# Patient Record
Sex: MALE | Race: BLACK OR AFRICAN AMERICAN | Employment: UNEMPLOYED | URBAN - METROPOLITAN AREA
[De-identification: names, ages, dates, MRNs, and addresses within clinical notes are randomized per-mention and may not be internally consistent; named-entity substitution may affect disease eponyms.]

---

## 2022-09-17 ENCOUNTER — HOSPITAL ENCOUNTER (EMERGENCY)
Age: 52
Discharge: HOME | End: 2022-09-17
Attending: STUDENT IN AN ORGANIZED HEALTH CARE EDUCATION/TRAINING PROGRAM
Payer: MEDICAID

## 2022-09-17 VITALS
TEMPERATURE: 100.1 F | RESPIRATION RATE: 16 BRPM | SYSTOLIC BLOOD PRESSURE: 141 MMHG | HEART RATE: 108 BPM | OXYGEN SATURATION: 99 % | DIASTOLIC BLOOD PRESSURE: 84 MMHG

## 2022-09-17 DIAGNOSIS — J06.9 VIRAL URI: Primary | ICD-10-CM

## 2022-09-17 DIAGNOSIS — U07.1 COVID-19: ICD-10-CM

## 2022-09-17 LAB — SARS-COV-2, NAAT: DETECTED

## 2022-09-17 PROCEDURE — 6370000000 HC RX 637 (ALT 250 FOR IP): Performed by: STUDENT IN AN ORGANIZED HEALTH CARE EDUCATION/TRAINING PROGRAM

## 2022-09-17 PROCEDURE — 99283 EMERGENCY DEPT VISIT LOW MDM: CPT

## 2022-09-17 PROCEDURE — 87635 SARS-COV-2 COVID-19 AMP PRB: CPT

## 2022-09-17 RX ORDER — ACETAMINOPHEN 500 MG
1000 TABLET ORAL ONCE
Status: COMPLETED | OUTPATIENT
Start: 2022-09-17 | End: 2022-09-17

## 2022-09-17 RX ADMIN — ACETAMINOPHEN 1000 MG: 500 TABLET ORAL at 20:22

## 2022-09-17 ASSESSMENT — ENCOUNTER SYMPTOMS
NAUSEA: 0
SHORTNESS OF BREATH: 0
DIARRHEA: 0
SORE THROAT: 1
COUGH: 1
VOMITING: 0
ABDOMINAL PAIN: 0
BACK PAIN: 0
EYE PAIN: 0

## 2022-09-17 ASSESSMENT — PAIN SCALES - GENERAL
PAINLEVEL_OUTOF10: 0
PAINLEVEL_OUTOF10: 0

## 2022-09-17 ASSESSMENT — PAIN - FUNCTIONAL ASSESSMENT: PAIN_FUNCTIONAL_ASSESSMENT: NONE - DENIES PAIN

## 2022-09-18 NOTE — ED PROVIDER NOTES
[address] [address] ENCOUNTER      Pt Name: Tye Franco  MRN: [id number]  [place] 1970  Date of evaluation: 9/17/2022  Provider: Selma Can MD    CHIEF COMPLAINT       Chief Complaint   Patient presents with    Positive For Covid-19     Pt states he was at a bible camp when he started not feeling well with head cold symptoms. Was tested there and it came back Positive. Pt states he does not believe the +Result and requesting we test him here. Took Ibuprofen earlier today for HA. Slight dry cough. Denies pain. Nasal congestion    HISTORY OF PRESENT ILLNESS   (Location/Symptom, Timing/Onset,Context/Setting, Quality, Duration, Modifying Factors, Severity)  Note limiting factors. Tye Franco is a 47 y.o. male who presents to the ED with a chief complaint of nasal congestion for the past day. Onset gradual, states feeling better than he did this morning. Was diagnosed with COVID via a rapid strep but patient was skeptical about the test results. He denies shortness of breath, nausea, vomiting. Has dry nonproductive cough, mild headache that resolved after he took 2 ibuprofen at home. Sore throat. States feels fine, \"I don't think that I have COVID. \"  Patient is vaccinated, boosted. Symptoms not otherwise alleviated or exacerbated by other factors. NursingNotes were reviewed. REVIEW OF SYSTEMS    (2-9 systems for level 4, 10 or more for level 5)     Review of Systems   Constitutional:  Negative for chills and fever. HENT:  Positive for congestion and sore throat. Eyes:  Negative for pain and visual disturbance. Respiratory:  Positive for cough. Negative for shortness of breath. Cardiovascular:  Negative for chest pain and palpitations. Gastrointestinal:  Negative for abdominal pain, diarrhea, nausea and vomiting. Genitourinary:  Negative for dysuria and frequency. Musculoskeletal:  Negative for back pain and neck pain.    Skin: Negative for rash and wound. Neurological:  Positive for headaches. Negative for dizziness, weakness and light-headedness. PAST MEDICAL HISTORY     Past Medical History:   Diagnosis Date    Bipolar 1 disorder ([facility] [address]     Depression     Schizo affective schizophrenia ([facility] [address]          SURGICALHISTORY     History reviewed. No pertinent surgical history. CURRENT MEDICATIONS     Seroquel. Depakote, benztropine. ALLERGIES     Patient has no known allergies. FAMILY HISTORY     History reviewed. No pertinent family history. SOCIAL HISTORY       Social History     Socioeconomic History    Marital status: Single     Spouse name: None    Number of children: None    Years of education: None    Highest education level: None   Tobacco Use    Smoking status: Every Day     Types: Cigarettes    Smokeless tobacco: Never   Vaping Use    Vaping Use: Never used       SCREENINGS    [employer] Coma Scale  Eye Opening: Spontaneous  Best Verbal Response: Oriented  Best Motor Response: Obeys commands  [employer] Coma Scale Score: 15        PHYSICAL EXAM    (up to 7 for level 4, 8 or more for level 5)     ED Triage Vitals [09/17/22 1940]   BP Temp Temp Source Heart Rate Resp SpO2 Height Weight   (!) 141/84 100.1 °F (37.8 °C) Oral (!) 108 16 99 % -- --       General: Alert and oriented appropriately for age, No acute distress. Eye: Normal conjunctiva. Sclera anicteric. HENT: Oral mucosa is moist.  Oropharynx unremarkable. Respiratory: Respirations even and non-labored. Clear to auscultation bilaterally. Cardiovascular: Normal rate, Regular rhythm. Intact peripheral pulses. Not tachycardic on my exam.  Gastrointestinal: Soft, Non-tender, Non-distended. : deferred. Musculoskeletal: No swelling. Integumentary: Warm, Dry. No rashes. Neurologic: Alert and appropriate for age. No focal deficits. Psychiatric: Cooperative.     DIAGNOSTIC RESULTS     LABS:  Labs Reviewed   COVID-19, RAPID - Abnormal; Notable for the following components:       Result Value    SARS-CoV-2, NAAT DETECTED (*)     All other components within normal limits    Narrative:     Yasmany Solorio tel. [id number],  Microbiology results called to and read back by Riley Leos RN, 2022  20:24, by [facility]       All other labs were within normal range or not returned as of this dictation. EMERGENCY DEPARTMENT COURSE and DIFFERENTIAL DIAGNOSIS/MDM:   Vitals:    Vitals:    22 1940   BP: (!) 141/84   Pulse: (!) 108   Resp: 16   Temp: 100.1 °F (37.8 °C)   TempSrc: Oral   SpO2: 99%         Medical decision makin-year-old man with mild URI symptoms, likely viral in etiology, likely COVID given the recent positive test.  Patient is interested in repeat testing. Discussed the possibility of a false negative test, especially early on in his symptomatic course. Patient perseverative about this, given Tylenol for mild hyperthermia, tested for COVID with rapid test.  Rapid test is positive. At this point patient is amenable to receiving the diagnosis of COVID-19 as etiology of his symptoms. He is hemodynamically stable, afebrile, protecting his airway, feeling fine, stable for and amenable to discharge home. Given discharge instructions, return precautions, isolation precautions, voiced understanding. Discharged home, ambulated steadily from the emergency department upon discharge. Medications   acetaminophen (TYLENOL) tablet 1,000 mg (1,000 mg Oral Given 22)         FINAL IMPRESSION      1.  Viral URI    2. COVID-19          DISPOSITION/PLAN   DISPOSITION Decision To Discharge 2022 08:36:16 PM      PATIENT REFERRED TO:  [address] Emergency Department  [address] [place] [address]  [phone]    If symptoms worsen        (Please note that portions of this note were completed with a voice recognition program.Efforts were made to edit the dictations but occasionally words are mis-transcribed.)    Chandan Weir MD (electronically signed)  Attending Emergency Physician          Selma Can MD  09/17/22 [id number]

## 2022-09-18 NOTE — DISCHARGE INSTRUCTIONS
